# Patient Record
Sex: MALE | Race: WHITE | URBAN - METROPOLITAN AREA
[De-identification: names, ages, dates, MRNs, and addresses within clinical notes are randomized per-mention and may not be internally consistent; named-entity substitution may affect disease eponyms.]

---

## 2022-04-18 ENCOUNTER — OFFICE VISIT (OUTPATIENT)
Dept: ORTHOPEDIC SURGERY | Age: 22
End: 2022-04-18
Payer: COMMERCIAL

## 2022-04-18 DIAGNOSIS — S33.5XXA SPRAIN OF LOW BACK, INITIAL ENCOUNTER: ICD-10-CM

## 2022-04-18 DIAGNOSIS — M54.50 LUMBAR SPINE PAIN: Primary | ICD-10-CM

## 2022-04-18 PROCEDURE — 99204 OFFICE O/P NEW MOD 45 MIN: CPT | Performed by: PHYSICIAN ASSISTANT

## 2022-04-18 RX ORDER — CYCLOBENZAPRINE HCL 10 MG
10 TABLET ORAL 3 TIMES DAILY PRN
Qty: 21 TABLET | Refills: 0 | Status: SHIPPED | OUTPATIENT
Start: 2022-04-18 | End: 2022-04-28

## 2022-04-18 RX ORDER — METHYLPREDNISOLONE 4 MG/1
TABLET ORAL
Qty: 1 KIT | Refills: 0 | Status: SHIPPED | OUTPATIENT
Start: 2022-04-18

## 2022-04-18 RX ORDER — MELOXICAM 15 MG/1
15 TABLET ORAL DAILY
Qty: 30 TABLET | Refills: 0 | Status: SHIPPED | OUTPATIENT
Start: 2022-04-18

## 2022-04-18 NOTE — PROGRESS NOTES
Chief Complaint  Lower Back Pain (pain in lumbar spine and along the lower back tissue; NO specific JEANETTE, gradually worsened)      History of Present Illness:  Ace Mondragon is a 19 y.o. male [facility] of ongoing back pain for the past month. He has had dull, achy pain in the center of his back with no acute injury. He has seen a chiropractor with no relief. Last week he was bending over to pick something up when he felt a pop in his back and his pain has worsened since. The pan is now radiating to both lateral hips and sometimes down to the knee. His pain can be alleviated when sitting up and leaning to the left side. His pain is is worse when he bends forward. He denies any weakness or bowel or bladder dysfunction     Pain Assessment  Location of Pain: Back  Location Modifiers: Right,Left (Midline pain)  Severity of Pain: 9  Quality of Pain: Sharp  Duration of Pain: Persistent  Frequency of Pain: Constant  Date Pain First Started: 03/18/22  Aggravating Factors: Bending,Stretching,Exercise  Limiting Behavior: Yes  Result of Injury: No  Work-Related Injury: No  Are there other pain locations you wish to document?: No       Medical History  Patient's medications, allergies, past medical, surgical, social and family histories were reviewed and updated as appropriate. Review of Systems  Pertinent items are noted in HPI  Review of systems reviewed from Patient History Form dated on 4/18/22 and available in the patient's chart under the Media tab. Vital Signs  There were no vitals filed for this visit. General Exam:     Constitutional: Patient is adequately groomed with no evidence of malnutrition  DTRs: Deep tendon reflexes are intact  Mental Status: The patient is oriented to time, place and person. The patient's mood and affect are appropriate.   Lymphatic: The lymphatic examination bilaterally reveals all areas to be without enlargement or induration. Vascular: Examination reveals no swelling or calf tenderness. Peripheral pulses are palpable and 2+. Neurological: The patient has good coordination. There is no weakness or sensory deficit. Lumbar/Sacral Spine Examination  Inspection:  No deformities, ecchymosis or signs of infection. Palpation:  Non tender to palpation of the lumbar spine, paraspinal muscles and lateral hips. Rang of Motion: Forward flexion limited secondary to pain. Decreased lumbar externsion. Strength:  5/5 strength in bilateral lower extremities. Special Tests:  Straight leg raise negative. Skin: There are no rashes, ulcerations or lesions. Distal motor sensory and vascular exams intact. Gait: Fluid smooth gait    Reflexes:  Symmetrically preserved      Additional Examinations:  Right Lower Extremity: Examination of the right lower extremity does not show any tenderness, deformity or injury. Range of motion is unremarkable. There is no gross instability. There are no rashes, ulcerations or lesions. Strength and tone are normal.  Left Lower Extremity: Examination of the left lower extremity does not show any tenderness, deformity or injury. Range of motion is unremarkable. There is no gross instability. There are no rashes, ulcerations or lesions. Strength and tone are normal.      Diagnostic Test Findings:  Xrays obtained in the after hours clinic including 2 views, AP and lateral of the lumbar spine demonstrate no acute bony abnormalities. Mild L5-S1 degenerative disc disease      Assessment:  Xrays and exam are consistent with Lumbar spine sprain. Impression:  Encounter Diagnoses   Name Primary?     Lumbar spine pain Yes    Sprain of low back, initial encounter        Office Procedures:  Orders Placed This Encounter   Procedures    XR LUMBAR SPINE (2-3 VIEWS)     Standing Status:   Future     Number of Occurrences:   1     Standing Expiration Date:   5/18/2022 Treatment Plan:  Treatment options were discussed with Imelda Doshi. He would like to proceed with  Physical therapy. We will start him on a medrol dose pack, followed by mobic 15 mg daily. He was also prescribed a muscle relaxant to be taken at night for spasms. He will follow up with our spine team in one month. I discussed with Imelda Doshi that his history, symptoms, signs and imaging are most consistent with Lumbosacral strain. We reviewed the natural history of these conditions and treatment options ranging from conservative measures (rest, icing, activity modification, physical therapy, pain meds) to surgical options. In terms of treatment, I recommended continuing with rest, icing, avoidance of painful activities, NSAIDs or pain meds as tolerated, and physical therapy. We discussed surgical options as well, should conservative measures fail. All questions were answered to the patients satisfaction during this encounter. This dictation was performed with a verbal recognition program (DRAGON) and it was checked for errors. It is possible that there are still dictated errors within this office note. If so, please bring any errors to my attention for an addendum. All efforts were made to ensure that this office note is accurate.